# Patient Record
Sex: FEMALE | Race: WHITE | NOT HISPANIC OR LATINO | Employment: FULL TIME | URBAN - METROPOLITAN AREA
[De-identification: names, ages, dates, MRNs, and addresses within clinical notes are randomized per-mention and may not be internally consistent; named-entity substitution may affect disease eponyms.]

---

## 2023-11-01 DIAGNOSIS — F51.04 CHRONIC INSOMNIA: ICD-10-CM

## 2023-11-01 DIAGNOSIS — F41.9 ANXIETY: ICD-10-CM

## 2023-11-01 PROBLEM — E03.9 HYPOTHYROID: Status: ACTIVE | Noted: 2020-08-17

## 2023-11-01 PROBLEM — E66.9 OBESITY: Status: ACTIVE | Noted: 2020-08-17

## 2023-11-01 RX ORDER — ZOLPIDEM TARTRATE 10 MG/1
TABLET ORAL
Qty: 15 TABLET | Refills: 0 | Status: SHIPPED | OUTPATIENT
Start: 2023-11-01 | End: 2023-11-30 | Stop reason: SDUPTHER

## 2023-11-01 RX ORDER — LORAZEPAM 0.5 MG/1
0.5 TABLET ORAL DAILY PRN
COMMUNITY
End: 2023-11-01 | Stop reason: SDUPTHER

## 2023-11-01 RX ORDER — LEVOTHYROXINE SODIUM 50 UG/1
50 TABLET ORAL DAILY
COMMUNITY
End: 2024-02-01

## 2023-11-01 RX ORDER — ZOLPIDEM TARTRATE 10 MG/1
TABLET ORAL
COMMUNITY
End: 2023-11-01 | Stop reason: SDUPTHER

## 2023-11-01 RX ORDER — MONTELUKAST SODIUM 10 MG/1
10 TABLET ORAL DAILY
COMMUNITY
Start: 2023-10-24 | End: 2023-12-20 | Stop reason: SDUPTHER

## 2023-11-01 RX ORDER — LORAZEPAM 0.5 MG/1
0.5 TABLET ORAL DAILY PRN
Qty: 15 TABLET | Refills: 0 | Status: SHIPPED | OUTPATIENT
Start: 2023-11-01 | End: 2023-11-30 | Stop reason: SDUPTHER

## 2023-11-01 RX ORDER — MELOXICAM 15 MG/1
15 TABLET ORAL DAILY
COMMUNITY
End: 2024-02-21 | Stop reason: SDUPTHER

## 2023-11-01 RX ORDER — CITALOPRAM 20 MG/1
40 TABLET, FILM COATED ORAL DAILY
COMMUNITY
End: 2024-02-01

## 2023-11-01 NOTE — TELEPHONE ENCOUNTER
RX Refill  Lorazepam 0.5mg  Zolpidem tartrate 10 mg  [facility]

## 2023-11-02 ENCOUNTER — TELEPHONE (OUTPATIENT)
Dept: PRIMARY CARE | Facility: CLINIC | Age: 63
End: 2023-11-02
Payer: COMMERCIAL

## 2023-11-02 DIAGNOSIS — R73.01 IMPAIRED FASTING GLUCOSE: ICD-10-CM

## 2023-11-02 DIAGNOSIS — Z79.899 DRUG THERAPY CONTINUED: ICD-10-CM

## 2023-11-02 DIAGNOSIS — E66.09 OBESITY DUE TO EXCESS CALORIES WITHOUT SERIOUS COMORBIDITY, UNSPECIFIED CLASSIFICATION: ICD-10-CM

## 2023-11-02 DIAGNOSIS — E03.9 HYPOTHYROIDISM, UNSPECIFIED TYPE: ICD-10-CM

## 2023-11-30 ENCOUNTER — TELEPHONE (OUTPATIENT)
Dept: PRIMARY CARE | Facility: CLINIC | Age: 63
End: 2023-11-30
Payer: COMMERCIAL

## 2023-11-30 DIAGNOSIS — F41.9 ANXIETY: ICD-10-CM

## 2023-11-30 DIAGNOSIS — F51.04 CHRONIC INSOMNIA: ICD-10-CM

## 2023-11-30 RX ORDER — ZOLPIDEM TARTRATE 10 MG/1
TABLET ORAL
Qty: 30 TABLET | Refills: 0 | Status: SHIPPED | OUTPATIENT
Start: 2023-11-30 | End: 2023-12-19 | Stop reason: ALTCHOICE

## 2023-11-30 RX ORDER — LORAZEPAM 0.5 MG/1
0.5 TABLET ORAL DAILY PRN
Qty: 30 TABLET | Refills: 0 | Status: SHIPPED | OUTPATIENT
Start: 2023-11-30 | End: 2024-02-09 | Stop reason: WASHOUT

## 2023-11-30 NOTE — TELEPHONE ENCOUNTER
Rx Refill Request Telephone Encounter    Name:  Cheryl Richter  :  [id number]  Medication Name:  zolpidem 10 mg                                   lorazepam 0.5 mg            Specific Pharmacy location:  [facility]  Date of last appointment:    Date of next appointment:    Best number to reach patient:  [phone]

## 2023-12-07 ENCOUNTER — APPOINTMENT (OUTPATIENT)
Dept: PRIMARY CARE | Facility: CLINIC | Age: 63
End: 2023-12-07
Payer: COMMERCIAL

## 2023-12-08 ENCOUNTER — LAB (OUTPATIENT)
Dept: LAB | Facility: LAB | Age: 63
End: 2023-12-08
Payer: COMMERCIAL

## 2023-12-08 DIAGNOSIS — Z79.899 DRUG THERAPY CONTINUED: ICD-10-CM

## 2023-12-08 DIAGNOSIS — R73.01 IMPAIRED FASTING GLUCOSE: ICD-10-CM

## 2023-12-08 DIAGNOSIS — E66.09 OBESITY DUE TO EXCESS CALORIES WITHOUT SERIOUS COMORBIDITY, UNSPECIFIED CLASSIFICATION: ICD-10-CM

## 2023-12-08 DIAGNOSIS — E03.9 HYPOTHYROIDISM, UNSPECIFIED TYPE: ICD-10-CM

## 2023-12-08 LAB
ALBUMIN SERPL-MCNC: 4.3 G/DL (ref 3.5–5)
ALP BLD-CCNC: 115 U/L (ref 35–125)
ALT SERPL-CCNC: 23 U/L (ref 5–40)
ANION GAP SERPL CALC-SCNC: 11 MMOL/L
AST SERPL-CCNC: 21 U/L (ref 5–40)
BASOPHILS # BLD AUTO: 0.08 X10*3/UL (ref 0–0.1)
BASOPHILS NFR BLD AUTO: 1 %
BILIRUB SERPL-MCNC: 0.3 MG/DL (ref 0.1–1.2)
BUN SERPL-MCNC: 18 MG/DL (ref 8–25)
CALCIUM SERPL-MCNC: 8.9 MG/DL (ref 8.5–10.4)
CHLORIDE SERPL-SCNC: 105 MMOL/L (ref 97–107)
CHOLEST SERPL-MCNC: 183 MG/DL (ref 133–200)
CHOLEST/HDLC SERPL: 5.2 {RATIO}
CO2 SERPL-SCNC: 26 MMOL/L (ref 24–31)
CREAT SERPL-MCNC: 0.8 MG/DL (ref 0.4–1.6)
EOSINOPHIL # BLD AUTO: 0.3 X10*3/UL (ref 0–0.7)
EOSINOPHIL NFR BLD AUTO: 3.6 %
ERYTHROCYTE [DISTWIDTH] IN BLOOD BY AUTOMATED COUNT: 13.2 % (ref 11.5–14.5)
EST. AVERAGE GLUCOSE BLD GHB EST-MCNC: 108 MG/DL
GFR SERPL CREATININE-BSD FRML MDRD: 82 ML/MIN/1.73M*2
GLUCOSE SERPL-MCNC: 82 MG/DL (ref 65–99)
HBA1C MFR BLD: 5.4 %
HCT VFR BLD AUTO: 44.4 % (ref 36–46)
HDLC SERPL-MCNC: 35 MG/DL
HGB BLD-MCNC: 13.9 G/DL (ref 12–16)
IMM GRANULOCYTES # BLD AUTO: 0.05 X10*3/UL (ref 0–0.7)
IMM GRANULOCYTES NFR BLD AUTO: 0.6 % (ref 0–0.9)
LDLC SERPL CALC-MCNC: 85 MG/DL (ref 65–130)
LYMPHOCYTES # BLD AUTO: 1.91 X10*3/UL (ref 1.2–4.8)
LYMPHOCYTES NFR BLD AUTO: 22.7 %
MCH RBC QN AUTO: 28.7 PG (ref 26–34)
MCHC RBC AUTO-ENTMCNC: 31.3 G/DL (ref 32–36)
MCV RBC AUTO: 92 FL (ref 80–100)
MONOCYTES # BLD AUTO: 0.56 X10*3/UL (ref 0.1–1)
MONOCYTES NFR BLD AUTO: 6.7 %
NEUTROPHILS # BLD AUTO: 5.51 X10*3/UL (ref 1.2–7.7)
NEUTROPHILS NFR BLD AUTO: 65.4 %
NRBC BLD-RTO: 0 /100 WBCS (ref 0–0)
PLATELET # BLD AUTO: 305 X10*3/UL (ref 150–450)
POTASSIUM SERPL-SCNC: 4.8 MMOL/L (ref 3.4–5.1)
PROT SERPL-MCNC: 6.7 G/DL (ref 5.9–7.9)
RBC # BLD AUTO: 4.85 X10*6/UL (ref 4–5.2)
SODIUM SERPL-SCNC: 142 MMOL/L (ref 133–145)
TRIGL SERPL-MCNC: 313 MG/DL (ref 40–150)
TSH SERPL DL<=0.05 MIU/L-ACNC: 2.38 MIU/L (ref 0.27–4.2)
WBC # BLD AUTO: 8.4 X10*3/UL (ref 4.4–11.3)

## 2023-12-08 PROCEDURE — 84443 ASSAY THYROID STIM HORMONE: CPT

## 2023-12-08 PROCEDURE — 80061 LIPID PANEL: CPT

## 2023-12-08 PROCEDURE — 36415 COLL VENOUS BLD VENIPUNCTURE: CPT

## 2023-12-08 PROCEDURE — 85025 COMPLETE CBC W/AUTO DIFF WBC: CPT

## 2023-12-08 PROCEDURE — 83036 HEMOGLOBIN GLYCOSYLATED A1C: CPT

## 2023-12-08 PROCEDURE — 80053 COMPREHEN METABOLIC PANEL: CPT

## 2023-12-09 ENCOUNTER — LAB (OUTPATIENT)
Dept: LAB | Facility: LAB | Age: 63
End: 2023-12-09
Payer: COMMERCIAL

## 2023-12-09 LAB
APPEARANCE UR: CLEAR
BILIRUB UR STRIP.AUTO-MCNC: NEGATIVE MG/DL
COLOR UR: NORMAL
GLUCOSE UR STRIP.AUTO-MCNC: NORMAL MG/DL
KETONES UR STRIP.AUTO-MCNC: NEGATIVE MG/DL
LEUKOCYTE ESTERASE UR QL STRIP.AUTO: NEGATIVE
NITRITE UR QL STRIP.AUTO: NEGATIVE
PH UR STRIP.AUTO: 6 [PH]
PROT UR STRIP.AUTO-MCNC: NEGATIVE MG/DL
RBC # UR STRIP.AUTO: NEGATIVE /UL
SP GR UR STRIP.AUTO: 1.01
UROBILINOGEN UR STRIP.AUTO-MCNC: NORMAL MG/DL

## 2023-12-09 PROCEDURE — 81003 URINALYSIS AUTO W/O SCOPE: CPT

## 2023-12-19 ENCOUNTER — TELEPHONE (OUTPATIENT)
Dept: PRIMARY CARE | Facility: CLINIC | Age: 63
End: 2023-12-19

## 2023-12-19 ENCOUNTER — OFFICE VISIT (OUTPATIENT)
Dept: PRIMARY CARE | Facility: CLINIC | Age: 63
End: 2023-12-19
Payer: COMMERCIAL

## 2023-12-19 VITALS
WEIGHT: 180 LBS | SYSTOLIC BLOOD PRESSURE: 130 MMHG | OXYGEN SATURATION: 98 % | DIASTOLIC BLOOD PRESSURE: 78 MMHG | HEART RATE: 78 BPM | HEIGHT: 60 IN | BODY MASS INDEX: 35.34 KG/M2

## 2023-12-19 DIAGNOSIS — F41.9 ANXIETY: ICD-10-CM

## 2023-12-19 DIAGNOSIS — E66.09 CLASS 2 OBESITY DUE TO EXCESS CALORIES WITHOUT SERIOUS COMORBIDITY WITH BODY MASS INDEX (BMI) OF 35.0 TO 35.9 IN ADULT: ICD-10-CM

## 2023-12-19 DIAGNOSIS — Z12.31 ENCOUNTER FOR SCREENING MAMMOGRAM FOR BREAST CANCER: ICD-10-CM

## 2023-12-19 DIAGNOSIS — F51.04 CHRONIC INSOMNIA: ICD-10-CM

## 2023-12-19 DIAGNOSIS — E03.9 ACQUIRED HYPOTHYROIDISM: ICD-10-CM

## 2023-12-19 DIAGNOSIS — L98.8 WRINKLES: Primary | ICD-10-CM

## 2023-12-19 DIAGNOSIS — J30.2 SEASONAL ALLERGIES: ICD-10-CM

## 2023-12-19 DIAGNOSIS — Z00.00 WELL ADULT EXAM: Primary | ICD-10-CM

## 2023-12-19 PROCEDURE — 1036F TOBACCO NON-USER: CPT | Performed by: FAMILY MEDICINE

## 2023-12-19 PROCEDURE — 1170F FXNL STATUS ASSESSED: CPT | Performed by: FAMILY MEDICINE

## 2023-12-19 PROCEDURE — 99397 PER PM REEVAL EST PAT 65+ YR: CPT | Performed by: FAMILY MEDICINE

## 2023-12-19 PROCEDURE — 3008F BODY MASS INDEX DOCD: CPT | Performed by: FAMILY MEDICINE

## 2023-12-19 PROCEDURE — 1159F MED LIST DOCD IN RCRD: CPT | Performed by: FAMILY MEDICINE

## 2023-12-19 PROCEDURE — 1126F AMNT PAIN NOTED NONE PRSNT: CPT | Performed by: FAMILY MEDICINE

## 2023-12-19 RX ORDER — TRAZODONE HYDROCHLORIDE 100 MG/1
100 TABLET ORAL NIGHTLY PRN
Qty: 90 TABLET | Refills: 1 | Status: SHIPPED | OUTPATIENT
Start: 2023-12-19 | End: 2023-12-28 | Stop reason: SINTOL

## 2023-12-19 ASSESSMENT — PATIENT HEALTH QUESTIONNAIRE - PHQ9
SUM OF ALL RESPONSES TO PHQ9 QUESTIONS 1 AND 2: 0
2. FEELING DOWN, DEPRESSED OR HOPELESS: NOT AT ALL
1. LITTLE INTEREST OR PLEASURE IN DOING THINGS: NOT AT ALL

## 2023-12-19 ASSESSMENT — ACTIVITIES OF DAILY LIVING (ADL)
BATHING: INDEPENDENT
DOING_HOUSEWORK: INDEPENDENT
MANAGING_FINANCES: INDEPENDENT
DRESSING: INDEPENDENT
GROCERY_SHOPPING: INDEPENDENT
TAKING_MEDICATION: INDEPENDENT

## 2023-12-19 ASSESSMENT — PAIN SCALES - GENERAL: PAINLEVEL: 0-NO PAIN

## 2023-12-19 NOTE — PROGRESS NOTES
"Subjective   Patient ID: Cheryl Richter is a 63 y.o. female who presents for Annual Exam (Refuses colonoscopy & EKG).    HPI   Cheryl is seen for comprehensive physical exam.  PMH, PSH, family history and social history were reviewed and updated.  GYN - sees specialist  SHONDA/Depression - controlled on citalopram 40mg daily and Ativan up to once a day as needed, OARRS reviewed  Chronic Insomnia - has used ambien 5mg as needed, has not been working so was taking it with the ativan,   Hypothyroidism - taking on empty stomach, No unexplained weight changes, fatigue, or excessive hair loss.    Review of Systems   Constitutional:  Negative for activity change, fatigue, fever and unexpected weight change.   HENT:  Negative for congestion, ear pain, hearing loss, rhinorrhea, sore throat and trouble swallowing.    Eyes:  Negative for visual disturbance.   Respiratory:  Negative for cough and shortness of breath.    Cardiovascular:  Negative for chest pain and palpitations.   Gastrointestinal:  Negative for abdominal pain and blood in stool.   Genitourinary:  Negative for difficulty urinating and hematuria.   Musculoskeletal:  Negative for arthralgias and myalgias.   Skin:  Negative for rash.   Neurological:  Negative for dizziness and numbness.   Psychiatric/Behavioral:  Negative for dysphoric mood. The patient is not nervous/anxious.        Objective   /78   Pulse 78   Ht 1.511 m (4' 11.5\")   Wt 81.6 kg (180 lb)   LMP  (LMP Unknown)   SpO2 98%   BMI 35.75 kg/m²     Physical Exam  Vitals and nursing note reviewed.   Constitutional:       General: She is not in acute distress.  HENT:      Right Ear: Tympanic membrane and ear canal normal.      Left Ear: Tympanic membrane and ear canal normal.      Nose: Nose normal.      Mouth/Throat:      Mouth: Mucous membranes are moist.      Pharynx: Oropharynx is clear.   Eyes:      Extraocular Movements: Extraocular movements intact.      Pupils: Pupils are equal, round, and " reactive to light.   Neck:      Vascular: No carotid bruit.   Cardiovascular:      Rate and Rhythm: Normal rate and regular rhythm.      Pulses: Normal pulses.      Heart sounds: Normal heart sounds.   Pulmonary:      Breath sounds: Normal breath sounds.   Abdominal:      General: Bowel sounds are normal.      Palpations: Abdomen is soft.      Tenderness: There is no abdominal tenderness.   Musculoskeletal:         General: Normal range of motion.   Skin:     General: Skin is warm.      Findings: No rash.   Neurological:      General: No focal deficit present.      Mental Status: She is alert.   Psychiatric:         Mood and Affect: Mood normal.         Assessment/Plan   Problem List Items Addressed This Visit             ICD-10-CM    Anxiety F41.9     Self relaxation discussed.  Continue with Citalopram daily.  Discussed long term issues related to use of Ativan including addiction and/or overdose.  No red flags. OARRS reviewed.          Chronic insomnia F51.04     Good sleep hygiene discussed.  Other medication options discussed in detail given that patient should not be on Ambien long-term.  Discussed changing to trazodone nightly as she has not tried this medication.  Patient agrees with medication change.         Relevant Medications    traZODone (Desyrel) 100 mg tablet    Hypothyroid E03.9     Euthyroid.  Continue to take levothyroxine on empty stomach         Obesity E66.9    Relevant Medications    semaglutide 0.25 mg or 0.5 mg (2 mg/3 mL) pen injector     Other Visit Diagnoses         Codes    Well adult exam    -  Primary  Preventative measures discussed.   Z00.00    Encounter for screening mammogram for breast cancer     Z12.31    Relevant Orders    BI mammo bilateral screening tomosynthesis

## 2023-12-19 NOTE — TELEPHONE ENCOUNTER
PT WAS SEEN TODAY FOR CPE WITH [facility] AND FORGOT TO ASK IF [facility] CAN GIVE HER RX FOR  MONTELUKAST 10 MG 1 X DAILY,  FOR SINUS DRAINAGE . THE DR WHO  WAS DOING THIS IS NO LONGER IN PRACTICE. CHARLEE DAVIES .

## 2023-12-20 RX ORDER — TRETINOIN 0.25 MG/G
CREAM TOPICAL NIGHTLY
COMMUNITY
End: 2023-12-20 | Stop reason: SDUPTHER

## 2023-12-20 RX ORDER — MONTELUKAST SODIUM 10 MG/1
10 TABLET ORAL DAILY
Qty: 90 TABLET | Refills: 4 | Status: SHIPPED | OUTPATIENT
Start: 2023-12-20

## 2023-12-20 RX ORDER — TRETINOIN 0.25 MG/G
CREAM TOPICAL NIGHTLY
Qty: 20 G | Refills: 3 | Status: SHIPPED | OUTPATIENT
Start: 2023-12-20

## 2023-12-21 ENCOUNTER — TELEPHONE (OUTPATIENT)
Dept: PRIMARY CARE | Facility: CLINIC | Age: 63
End: 2023-12-21
Payer: COMMERCIAL

## 2023-12-21 NOTE — TELEPHONE ENCOUNTER
"Pt informed & educated stated \"that is age discrimination\" and \"I have taken them both for years\" stated \"ok thank you good bye\" and hung up than called back and asked if she stops taking the ativan can she have the Ambien back?  "

## 2023-12-25 ASSESSMENT — ENCOUNTER SYMPTOMS
ARTHRALGIAS: 0
PALPITATIONS: 0
DYSPHORIC MOOD: 0
FATIGUE: 0
DIFFICULTY URINATING: 0
MYALGIAS: 0
UNEXPECTED WEIGHT CHANGE: 0
ACTIVITY CHANGE: 0
NERVOUS/ANXIOUS: 0
HEMATURIA: 0
SORE THROAT: 0
DIZZINESS: 0
RHINORRHEA: 0
COUGH: 0
ABDOMINAL PAIN: 0
FEVER: 0
SHORTNESS OF BREATH: 0
NUMBNESS: 0
BLOOD IN STOOL: 0
TROUBLE SWALLOWING: 0

## 2023-12-26 NOTE — ASSESSMENT & PLAN NOTE
Good sleep hygiene discussed.  Other medication options discussed in detail given that patient should not be on Ambien long-term.  Discussed changing to trazodone nightly as she has not tried this medication.  Patient agrees with medication change.

## 2023-12-26 NOTE — ASSESSMENT & PLAN NOTE
Self relaxation discussed.  Continue with Citalopram daily.  Discussed long term issues related to use of Ativan including addiction and/or overdose.  No red flags. OARRS reviewed.

## 2023-12-28 ENCOUNTER — TELEPHONE (OUTPATIENT)
Dept: PRIMARY CARE | Facility: CLINIC | Age: 63
End: 2023-12-28
Payer: COMMERCIAL

## 2023-12-28 DIAGNOSIS — F51.04 CHRONIC INSOMNIA: ICD-10-CM

## 2023-12-28 RX ORDER — ZOLPIDEM TARTRATE 5 MG/1
5 TABLET ORAL NIGHTLY PRN
Qty: 30 TABLET | Refills: 0 | Status: SHIPPED | OUTPATIENT
Start: 2023-12-28 | End: 2024-01-25 | Stop reason: SDUPTHER

## 2023-12-28 NOTE — TELEPHONE ENCOUNTER
RX Line:  Tretinoin cream, [facility] stating [facility] need more information to fill prescription. Also mentioned she was told Ambien would be getting sent in for her on the 29th. Hasn't received it.

## 2024-01-10 ENCOUNTER — TELEPHONE (OUTPATIENT)
Dept: PRIMARY CARE | Facility: CLINIC | Age: 64
End: 2024-01-10
Payer: COMMERCIAL

## 2024-01-10 DIAGNOSIS — E66.09 CLASS 2 OBESITY DUE TO EXCESS CALORIES WITHOUT SERIOUS COMORBIDITY WITH BODY MASS INDEX (BMI) OF 35.0 TO 35.9 IN ADULT: ICD-10-CM

## 2024-01-17 ENCOUNTER — TELEPHONE (OUTPATIENT)
Dept: PRIMARY CARE | Facility: CLINIC | Age: 64
End: 2024-01-17
Payer: COMMERCIAL

## 2024-01-17 NOTE — TELEPHONE ENCOUNTER
PT CALLED BACK TO MAKE THE WEIGHT CHECK APPT, ASKED IF SHE HAS TO PAY HER COPAY EVERYTIME, I SAID YES, SHE THEN ASKED HOW OFTEN SHE WILL NEED TO COME IN, I TOLD HER I DID  NOT KNOW . SHE SAID SHE WOULD HAVE TO THINK ABOUT THIS A LITTLE MORE, CAN NOT AFFORD TO PAY THE COPAYS.

## 2024-01-25 ENCOUNTER — TELEPHONE (OUTPATIENT)
Dept: PRIMARY CARE | Facility: CLINIC | Age: 64
End: 2024-01-25
Payer: COMMERCIAL

## 2024-01-25 DIAGNOSIS — F51.04 CHRONIC INSOMNIA: ICD-10-CM

## 2024-01-25 RX ORDER — ZOLPIDEM TARTRATE 5 MG/1
5 TABLET ORAL NIGHTLY PRN
Qty: 30 TABLET | Refills: 0 | Status: SHIPPED | OUTPATIENT
Start: 2024-01-25 | End: 2024-02-23 | Stop reason: SDUPTHER

## 2024-01-30 ENCOUNTER — PATIENT MESSAGE (OUTPATIENT)
Dept: PRIMARY CARE | Facility: CLINIC | Age: 64
End: 2024-01-30
Payer: COMMERCIAL

## 2024-01-30 DIAGNOSIS — L98.8 WRINKLES: ICD-10-CM

## 2024-01-30 RX ORDER — TRETINOIN 0.25 MG/G
CREAM TOPICAL NIGHTLY
Qty: 45 G | Refills: 3 | OUTPATIENT
Start: 2024-01-30

## 2024-02-01 DIAGNOSIS — E03.9 ACQUIRED HYPOTHYROIDISM: ICD-10-CM

## 2024-02-01 DIAGNOSIS — E66.09 CLASS 2 OBESITY DUE TO EXCESS CALORIES WITHOUT SERIOUS COMORBIDITY WITH BODY MASS INDEX (BMI) OF 35.0 TO 35.9 IN ADULT: ICD-10-CM

## 2024-02-01 DIAGNOSIS — F43.21 ADJUSTMENT DISORDER WITH DEPRESSED MOOD: Primary | ICD-10-CM

## 2024-02-01 RX ORDER — CITALOPRAM 20 MG/1
40 TABLET, FILM COATED ORAL DAILY
Qty: 180 TABLET | Refills: 4 | Status: SHIPPED | OUTPATIENT
Start: 2024-02-01

## 2024-02-01 RX ORDER — SEMAGLUTIDE 0.68 MG/ML
0.25 INJECTION, SOLUTION SUBCUTANEOUS
Qty: 3 ML | Refills: 0 | Status: SHIPPED | OUTPATIENT
Start: 2024-02-01 | End: 2024-02-02

## 2024-02-01 RX ORDER — LEVOTHYROXINE SODIUM 50 UG/1
50 TABLET ORAL DAILY
Qty: 90 TABLET | Refills: 3 | Status: SHIPPED | OUTPATIENT
Start: 2024-02-01

## 2024-02-02 ENCOUNTER — APPOINTMENT (OUTPATIENT)
Dept: PRIMARY CARE | Facility: CLINIC | Age: 64
End: 2024-02-02
Payer: COMMERCIAL

## 2024-02-02 DIAGNOSIS — E66.09 CLASS 2 OBESITY DUE TO EXCESS CALORIES WITHOUT SERIOUS COMORBIDITY WITH BODY MASS INDEX (BMI) OF 35.0 TO 35.9 IN ADULT: ICD-10-CM

## 2024-02-03 RX ORDER — SEMAGLUTIDE 0.68 MG/ML
0.25 INJECTION, SOLUTION SUBCUTANEOUS
OUTPATIENT
Start: 2024-02-03

## 2024-02-05 ENCOUNTER — TELEPHONE (OUTPATIENT)
Dept: PRIMARY CARE | Facility: CLINIC | Age: 64
End: 2024-02-05
Payer: COMMERCIAL

## 2024-02-09 ENCOUNTER — DOCUMENTATION (OUTPATIENT)
Dept: PRIMARY CARE | Facility: CLINIC | Age: 64
End: 2024-02-09

## 2024-02-09 ENCOUNTER — PATIENT MESSAGE (OUTPATIENT)
Dept: PRIMARY CARE | Facility: CLINIC | Age: 64
End: 2024-02-09

## 2024-02-09 ENCOUNTER — OFFICE VISIT (OUTPATIENT)
Dept: PRIMARY CARE | Facility: CLINIC | Age: 64
End: 2024-02-09
Payer: COMMERCIAL

## 2024-02-09 VITALS
TEMPERATURE: 98 F | WEIGHT: 174 LBS | OXYGEN SATURATION: 97 % | HEART RATE: 100 BPM | HEIGHT: 59 IN | DIASTOLIC BLOOD PRESSURE: 86 MMHG | SYSTOLIC BLOOD PRESSURE: 128 MMHG | BODY MASS INDEX: 35.08 KG/M2

## 2024-02-09 DIAGNOSIS — F43.21 ADJUSTMENT DISORDER WITH DEPRESSED MOOD: ICD-10-CM

## 2024-02-09 DIAGNOSIS — R73.01 IMPAIRED FASTING GLUCOSE: Primary | ICD-10-CM

## 2024-02-09 DIAGNOSIS — E66.09 OBESITY DUE TO EXCESS CALORIES WITHOUT SERIOUS COMORBIDITY, UNSPECIFIED CLASSIFICATION: Primary | ICD-10-CM

## 2024-02-09 DIAGNOSIS — R73.01 IMPAIRED FASTING GLUCOSE: ICD-10-CM

## 2024-02-09 PROCEDURE — 1126F AMNT PAIN NOTED NONE PRSNT: CPT | Performed by: FAMILY MEDICINE

## 2024-02-09 PROCEDURE — 3008F BODY MASS INDEX DOCD: CPT | Performed by: FAMILY MEDICINE

## 2024-02-09 PROCEDURE — 1159F MED LIST DOCD IN RCRD: CPT | Performed by: FAMILY MEDICINE

## 2024-02-09 PROCEDURE — 99214 OFFICE O/P EST MOD 30 MIN: CPT | Performed by: FAMILY MEDICINE

## 2024-02-09 PROCEDURE — 1036F TOBACCO NON-USER: CPT | Performed by: FAMILY MEDICINE

## 2024-02-09 ASSESSMENT — PAIN SCALES - GENERAL: PAINLEVEL: 0-NO PAIN

## 2024-02-09 NOTE — PROGRESS NOTES
"Subjective   Patient ID: Cheryl Richter is a 63 y.o. female who presents for Weight Check.    HPI   Cheryl presents for follow up on her ozempic. Initially wasn't giving herself injection correctly. Lost son unexpectedly after [place] from alcohol. January started taking the ozempic regularly and doing well. No constipation. Improving dietary choices. Walking at work but nothing extra.     Review of Systems  All other systems have been reviewed and are negative except as noted in the HPI.       Objective   /86 (BP Location: Left arm)   Pulse 100   Temp 36.7 °C (98 °F) (Temporal)   Ht 1.499 m (4' 11\")   Wt 78.9 kg (174 lb)   LMP  (LMP Unknown)   SpO2 97%   BMI 35.14 kg/m²     Physical Exam  Alert.  No acute distress.  Judgment and insight intact.    Assessment/Plan   Diagnoses and all orders for this visit:  Impaired fasting glucose  -     semaglutide 0.25 mg or 0.5 mg (2 mg/3 mL) pen injector; Inject 0.5 mg under the skin 1 (one) time per week.  - Continue with semaglutide.  Adjustment disorder with depressed mood   Continue with citalopram 40 mg daily.  Zolpidem at bedtime.  Indications, benefits and potential risks of long-term use discussed in detail with patient including the possibility of addiction and overdose.       "

## 2024-02-11 RX ORDER — ORLISTAT 120 MG/1
120 CAPSULE ORAL
Qty: 90 CAPSULE | Refills: 11 | Status: SHIPPED | OUTPATIENT
Start: 2024-02-11 | End: 2024-02-14 | Stop reason: SDUPTHER

## 2024-02-12 ENCOUNTER — PATIENT MESSAGE (OUTPATIENT)
Dept: PRIMARY CARE | Facility: CLINIC | Age: 64
End: 2024-02-12
Payer: COMMERCIAL

## 2024-02-12 DIAGNOSIS — R73.01 IMPAIRED FASTING GLUCOSE: ICD-10-CM

## 2024-02-12 DIAGNOSIS — E66.09 OBESITY DUE TO EXCESS CALORIES WITHOUT SERIOUS COMORBIDITY, UNSPECIFIED CLASSIFICATION: ICD-10-CM

## 2024-02-12 RX ORDER — ORLISTAT 120 MG/1
120 CAPSULE ORAL
Qty: 270 CAPSULE | Refills: 3 | OUTPATIENT
Start: 2024-02-12 | End: 2025-02-11

## 2024-02-14 ENCOUNTER — PATIENT MESSAGE (OUTPATIENT)
Dept: PRIMARY CARE | Facility: CLINIC | Age: 64
End: 2024-02-14
Payer: COMMERCIAL

## 2024-02-14 DIAGNOSIS — E66.09 OBESITY DUE TO EXCESS CALORIES WITHOUT SERIOUS COMORBIDITY, UNSPECIFIED CLASSIFICATION: ICD-10-CM

## 2024-02-14 DIAGNOSIS — R73.01 IMPAIRED FASTING GLUCOSE: ICD-10-CM

## 2024-02-14 RX ORDER — ORLISTAT 120 MG/1
120 CAPSULE ORAL
Qty: 270 CAPSULE | Refills: 1 | Status: SHIPPED | OUTPATIENT
Start: 2024-02-14 | End: 2024-02-21 | Stop reason: SDUPTHER

## 2024-02-21 ENCOUNTER — PATIENT MESSAGE (OUTPATIENT)
Dept: PRIMARY CARE | Facility: CLINIC | Age: 64
End: 2024-02-21
Payer: COMMERCIAL

## 2024-02-21 DIAGNOSIS — R52 PAIN: ICD-10-CM

## 2024-02-21 DIAGNOSIS — E66.09 OBESITY DUE TO EXCESS CALORIES WITHOUT SERIOUS COMORBIDITY, UNSPECIFIED CLASSIFICATION: ICD-10-CM

## 2024-02-21 DIAGNOSIS — R73.01 IMPAIRED FASTING GLUCOSE: ICD-10-CM

## 2024-02-21 RX ORDER — MELOXICAM 15 MG/1
15 TABLET ORAL DAILY
Qty: 90 TABLET | Refills: 1 | Status: SHIPPED | OUTPATIENT
Start: 2024-02-21

## 2024-02-21 RX ORDER — ORLISTAT 120 MG/1
120 CAPSULE ORAL
Qty: 270 CAPSULE | Refills: 1 | Status: SHIPPED | OUTPATIENT
Start: 2024-02-21 | End: 2024-03-14

## 2024-02-23 DIAGNOSIS — F51.04 CHRONIC INSOMNIA: ICD-10-CM

## 2024-02-23 RX ORDER — ZOLPIDEM TARTRATE 5 MG/1
5 TABLET ORAL NIGHTLY PRN
Qty: 30 TABLET | Refills: 0 | Status: SHIPPED | OUTPATIENT
Start: 2024-02-23 | End: 2024-03-14 | Stop reason: SDUPTHER

## 2024-02-23 NOTE — TELEPHONE ENCOUNTER
I have personally reviewed the OARRS report.  I have considered the risks of abuse, dependence, addiction and diversion.  I believe that it is clinically appropriate for patient to be prescribed this medication.

## 2024-03-11 ENCOUNTER — TELEPHONE (OUTPATIENT)
Dept: PRIMARY CARE | Facility: CLINIC | Age: 64
End: 2024-03-11
Payer: COMMERCIAL

## 2024-03-11 NOTE — TELEPHONE ENCOUNTER
[facility] CALLING  ,  RX FOR ORLISTAT  120 MG  CAPS ARE ON BACK ORDER CAN SOMETHING ELSE BE SENT IN?  [phone] REF #[id number]

## 2024-03-13 ENCOUNTER — PATIENT MESSAGE (OUTPATIENT)
Dept: PRIMARY CARE | Facility: CLINIC | Age: 64
End: 2024-03-13
Payer: COMMERCIAL

## 2024-03-14 ENCOUNTER — PATIENT MESSAGE (OUTPATIENT)
Dept: PRIMARY CARE | Facility: CLINIC | Age: 64
End: 2024-03-14
Payer: COMMERCIAL

## 2024-03-14 ENCOUNTER — TELEPHONE (OUTPATIENT)
Dept: PRIMARY CARE | Facility: CLINIC | Age: 64
End: 2024-03-14
Payer: COMMERCIAL

## 2024-03-14 DIAGNOSIS — E66.09 OBESITY DUE TO EXCESS CALORIES WITHOUT SERIOUS COMORBIDITY, UNSPECIFIED CLASSIFICATION: Primary | ICD-10-CM

## 2024-03-14 DIAGNOSIS — F51.04 CHRONIC INSOMNIA: ICD-10-CM

## 2024-03-14 NOTE — TELEPHONE ENCOUNTER
Patient called RX line to get a refill on zolpidem 5  mg.  [facility] [address]  Contact: Mobile

## 2024-03-20 RX ORDER — ZOLPIDEM TARTRATE 5 MG/1
5 TABLET ORAL NIGHTLY PRN
Qty: 30 TABLET | Refills: 0 | Status: SHIPPED | OUTPATIENT
Start: 2024-03-20 | End: 2024-04-23 | Stop reason: SDUPTHER

## 2024-04-23 ENCOUNTER — TELEPHONE (OUTPATIENT)
Dept: PRIMARY CARE | Facility: CLINIC | Age: 64
End: 2024-04-23

## 2024-04-23 DIAGNOSIS — F51.04 CHRONIC INSOMNIA: ICD-10-CM

## 2024-04-23 RX ORDER — ZOLPIDEM TARTRATE 5 MG/1
5 TABLET ORAL NIGHTLY PRN
Qty: 30 TABLET | Refills: 0 | Status: SHIPPED | OUTPATIENT
Start: 2024-04-23 | End: 2024-05-24 | Stop reason: SDUPTHER

## 2024-04-23 NOTE — TELEPHONE ENCOUNTER
Patient called rx line:    zolpidem (Ambien) 5mg tablet    [facility] [address] in [place]    Patient Number: [phone]

## 2024-05-24 ENCOUNTER — TELEPHONE (OUTPATIENT)
Dept: PRIMARY CARE | Facility: CLINIC | Age: 64
End: 2024-05-24
Payer: COMMERCIAL

## 2024-05-24 DIAGNOSIS — F51.04 CHRONIC INSOMNIA: ICD-10-CM

## 2024-05-24 RX ORDER — ZOLPIDEM TARTRATE 5 MG/1
5 TABLET ORAL NIGHTLY PRN
Qty: 30 TABLET | Refills: 0 | Status: SHIPPED | OUTPATIENT
Start: 2024-05-24 | End: 2024-06-23

## 2024-05-24 NOTE — TELEPHONE ENCOUNTER
Patient needs refill of Zolpidem 5 mg sent to [facility] on [address] in [place].    Contact:  [phone]

## 2024-06-20 ENCOUNTER — TELEPHONE (OUTPATIENT)
Dept: PRIMARY CARE | Facility: CLINIC | Age: 64
End: 2024-06-20
Payer: COMMERCIAL

## 2024-06-20 DIAGNOSIS — F51.04 CHRONIC INSOMNIA: ICD-10-CM

## 2024-06-20 RX ORDER — ZOLPIDEM TARTRATE 5 MG/1
5 TABLET ORAL NIGHTLY PRN
Qty: 30 TABLET | Refills: 0 | Status: SHIPPED | OUTPATIENT
Start: 2024-06-20 | End: 2024-07-20

## 2024-06-20 NOTE — TELEPHONE ENCOUNTER
Patient called Rx line and requested a refill for Zolpidem 5 mg.   Pharmacy: [facility]  Patient phone #: [phone]

## 2024-07-22 ENCOUNTER — TELEPHONE (OUTPATIENT)
Dept: PRIMARY CARE | Facility: CLINIC | Age: 64
End: 2024-07-22
Payer: COMMERCIAL

## 2024-07-22 DIAGNOSIS — J30.2 SEASONAL ALLERGIES: ICD-10-CM

## 2024-07-22 RX ORDER — MONTELUKAST SODIUM 10 MG/1
10 TABLET ORAL DAILY
Qty: 90 TABLET | Refills: 1 | Status: SHIPPED | OUTPATIENT
Start: 2024-07-22 | End: 2025-01-18

## 2024-07-22 NOTE — TELEPHONE ENCOUNTER
Patient called Rx line and requested a refill for Montelukast 10 mg.   Pharmacy: [facility]  Patient phone #: [phone]

## 2024-07-29 DIAGNOSIS — F51.04 CHRONIC INSOMNIA: ICD-10-CM

## 2024-07-29 RX ORDER — ZOLPIDEM TARTRATE 5 MG/1
5 TABLET ORAL NIGHTLY PRN
Qty: 30 TABLET | Refills: 0 | Status: SHIPPED | OUTPATIENT
Start: 2024-07-29 | End: 2024-08-28

## 2024-07-29 NOTE — TELEPHONE ENCOUNTER
Patient wants to switch from [facility] to [facility]. She needs an appt within 30 days for refills and her CPE needs scheduled for December. I told her once we find out if the PCP switch is approved or not, we will call her to schedule this. Her number is [phone]

## 2024-07-29 NOTE — TELEPHONE ENCOUNTER
Patient wants a refill on Zolpidem 5 mg.  Pharmacy is [facility] on [address] [facility] [address] [address] in [place].    Patient's number:  [phone]

## 2024-07-30 ENCOUNTER — TELEPHONE (OUTPATIENT)
Dept: PRIMARY CARE | Facility: CLINIC | Age: 64
End: 2024-07-30
Payer: COMMERCIAL

## 2024-07-30 NOTE — TELEPHONE ENCOUNTER
Pt stated that she requested to change her pcp, I did not see that. She wishes to cancel that request

## 2024-08-02 DIAGNOSIS — R52 PAIN: ICD-10-CM

## 2024-08-02 RX ORDER — MELOXICAM 15 MG/1
15 TABLET ORAL DAILY
Qty: 90 TABLET | Refills: 1 | Status: SHIPPED | OUTPATIENT
Start: 2024-08-02

## 2024-08-08 ENCOUNTER — APPOINTMENT (OUTPATIENT)
Dept: PRIMARY CARE | Facility: CLINIC | Age: 64
End: 2024-08-08
Payer: COMMERCIAL

## 2024-08-08 VITALS
SYSTOLIC BLOOD PRESSURE: 124 MMHG | WEIGHT: 179 LBS | BODY MASS INDEX: 36.15 KG/M2 | OXYGEN SATURATION: 99 % | DIASTOLIC BLOOD PRESSURE: 72 MMHG | HEART RATE: 86 BPM

## 2024-08-08 DIAGNOSIS — R73.01 IMPAIRED FASTING GLUCOSE: ICD-10-CM

## 2024-08-08 DIAGNOSIS — E66.09 OBESITY DUE TO EXCESS CALORIES WITHOUT SERIOUS COMORBIDITY, UNSPECIFIED CLASSIFICATION: ICD-10-CM

## 2024-08-08 DIAGNOSIS — E66.09 OBESITY DUE TO EXCESS CALORIES WITHOUT SERIOUS COMORBIDITY, UNSPECIFIED CLASSIFICATION: Primary | ICD-10-CM

## 2024-08-08 DIAGNOSIS — M67.40 GANGLION CYST: ICD-10-CM

## 2024-08-08 PROCEDURE — 1159F MED LIST DOCD IN RCRD: CPT | Performed by: FAMILY MEDICINE

## 2024-08-08 PROCEDURE — 1126F AMNT PAIN NOTED NONE PRSNT: CPT | Performed by: FAMILY MEDICINE

## 2024-08-08 PROCEDURE — 1123F ACP DISCUSS/DSCN MKR DOCD: CPT | Performed by: FAMILY MEDICINE

## 2024-08-08 PROCEDURE — 1160F RVW MEDS BY RX/DR IN RCRD: CPT | Performed by: FAMILY MEDICINE

## 2024-08-08 PROCEDURE — 99213 OFFICE O/P EST LOW 20 MIN: CPT | Performed by: FAMILY MEDICINE

## 2024-08-08 RX ORDER — SEMAGLUTIDE 0.5 MG/.5ML
0.5 INJECTION, SOLUTION SUBCUTANEOUS
Qty: 6 ML | Refills: 0 | Status: SHIPPED | OUTPATIENT
Start: 2024-08-11 | End: 2024-08-13 | Stop reason: SDUPTHER

## 2024-08-08 ASSESSMENT — PAIN SCALES - GENERAL: PAINLEVEL: 0-NO PAIN

## 2024-08-08 NOTE — PROGRESS NOTES
Subjective   Patient ID: Cheryl Richter is a 64 y.o. female who presents for Follow-up (Semaglutide.).    HPI   Right middle finger cyst    Review of Systems    Objective   /72   Pulse 86   Wt 81.2 kg (179 lb)   LMP  (LMP Unknown)   SpO2 99%   BMI 36.15 kg/m²     Physical Exam    Assessment/Plan

## 2024-08-08 NOTE — ASSESSMENT & PLAN NOTE
Continue with Wegovy.  Will increase to half milligram nightly for the next 12 weeks.  Healthy diet and exercise discussed.  Follow-up in 3 months  Orders:    semaglutide, weight loss, (Wegovy) 0.5 mg/0.5 mL pen injector; Inject 0.5 mg under the skin 1 (one) time per week for 12 doses.

## 2024-08-08 NOTE — ASSESSMENT & PLAN NOTE
Orders:    semaglutide, weight loss, (Wegovy) 0.5 mg/0.5 mL pen injector; Inject 0.5 mg under the skin 1 (one) time per week for 12 doses.

## 2024-08-08 NOTE — PROGRESS NOTES
Subjective   Patient ID: Cheryl Richter is a 64 y.o. female who presents for Follow-up (Semaglutide.).    Cheryl Richter is seen for her chronic issues.  She was started on semaglutide in the past secondary to her anxiety.  Feels that is helping immensely.  Curbs her appetite.      Review of Systems  All other systems have been reviewed and are negative except as noted in the HPI.       Objective  Vitals:  /72   Pulse 86   Wt 81.2 kg (179 lb)   LMP  (LMP Unknown)   SpO2 99%   BMI 36.15 kg/m²     Physical Exam  Alert.  No acute distress.  Judgment and insight intact.    Assessment/Plan   Assessment & Plan  Obesity due to excess calories without serious comorbidity, unspecified classification  Continue with Wegovy.  Will increase to half milligram nightly for the next 12 weeks.  Healthy diet and exercise discussed.  Follow-up in 3 months  Orders:    semaglutide, weight loss, (Wegovy) 0.5 mg/0.5 mL pen injector; Inject 0.5 mg under the skin 1 (one) time per week for 12 doses.    Impaired fasting glucose    Orders:    semaglutide, weight loss, (Wegovy) 0.5 mg/0.5 mL pen injector; Inject 0.5 mg under the skin 1 (one) time per week for 12 doses.    Ganglion cyst    Orders:    Referral to Orthopaedic Surgery; Future

## 2024-08-13 ENCOUNTER — TELEPHONE (OUTPATIENT)
Dept: PRIMARY CARE | Facility: CLINIC | Age: 64
End: 2024-08-13
Payer: COMMERCIAL

## 2024-08-13 RX ORDER — SEMAGLUTIDE 0.5 MG/.5ML
0.5 INJECTION, SOLUTION SUBCUTANEOUS
Qty: 6 ML | Refills: 0 | Status: SHIPPED | OUTPATIENT
Start: 2024-08-18 | End: 2024-11-04

## 2024-08-13 NOTE — TELEPHONE ENCOUNTER
Patient requesting a new RX for Wegovy to be sent to the [facility] on [address] [address]. The Mail order pharmacy called her and said they no longer mail Wegovy    Contact: [phone]

## 2024-08-27 ENCOUNTER — TELEPHONE (OUTPATIENT)
Dept: PRIMARY CARE | Facility: CLINIC | Age: 64
End: 2024-08-27
Payer: COMMERCIAL

## 2024-08-27 DIAGNOSIS — F51.04 CHRONIC INSOMNIA: ICD-10-CM

## 2024-08-27 RX ORDER — ZOLPIDEM TARTRATE 5 MG/1
5 TABLET ORAL NIGHTLY PRN
Qty: 30 TABLET | Refills: 0 | Status: SHIPPED | OUTPATIENT
Start: 2024-08-27 | End: 2024-09-26

## 2024-08-27 NOTE — TELEPHONE ENCOUNTER
Patient called on RX line to request a refill of Zolpidem 5 mg be forwarded to [facility] in [place].  Please advise.

## 2024-09-08 ENCOUNTER — PATIENT MESSAGE (OUTPATIENT)
Dept: PRIMARY CARE | Facility: CLINIC | Age: 64
End: 2024-09-08
Payer: COMMERCIAL

## 2024-09-08 DIAGNOSIS — R73.01 IMPAIRED FASTING GLUCOSE: ICD-10-CM

## 2024-09-08 DIAGNOSIS — E66.09 OBESITY DUE TO EXCESS CALORIES WITHOUT SERIOUS COMORBIDITY, UNSPECIFIED CLASSIFICATION: ICD-10-CM

## 2024-09-09 RX ORDER — SEMAGLUTIDE 0.5 MG/.5ML
1 INJECTION, SOLUTION SUBCUTANEOUS
Qty: 12 ML | Refills: 0 | Status: SHIPPED | OUTPATIENT
Start: 2024-09-15 | End: 2024-09-10 | Stop reason: ENTERED-IN-ERROR

## 2024-09-10 RX ORDER — SEMAGLUTIDE 1 MG/.5ML
1 INJECTION, SOLUTION SUBCUTANEOUS WEEKLY
Qty: 6 ML | Refills: 0 | Status: SHIPPED | OUTPATIENT
Start: 2024-09-10 | End: 2024-11-27

## 2024-09-16 ENCOUNTER — APPOINTMENT (OUTPATIENT)
Dept: PRIMARY CARE | Facility: CLINIC | Age: 64
End: 2024-09-16
Payer: COMMERCIAL

## 2024-09-16 VITALS — WEIGHT: 168 LBS | DIASTOLIC BLOOD PRESSURE: 70 MMHG | BODY MASS INDEX: 33.93 KG/M2 | SYSTOLIC BLOOD PRESSURE: 108 MMHG

## 2024-09-16 DIAGNOSIS — E66.09 OBESITY DUE TO EXCESS CALORIES WITHOUT SERIOUS COMORBIDITY, UNSPECIFIED CLASSIFICATION: ICD-10-CM

## 2024-09-24 ENCOUNTER — LAB REQUISITION (OUTPATIENT)
Dept: LAB | Facility: HOSPITAL | Age: 64
End: 2024-09-24
Payer: COMMERCIAL

## 2024-09-24 DIAGNOSIS — R22.31 LOCALIZED SWELLING, MASS AND LUMP, RIGHT UPPER LIMB: ICD-10-CM

## 2024-09-24 PROCEDURE — 88305 TISSUE EXAM BY PATHOLOGIST: CPT

## 2024-09-26 ENCOUNTER — TELEPHONE (OUTPATIENT)
Dept: PRIMARY CARE | Facility: CLINIC | Age: 64
End: 2024-09-26
Payer: COMMERCIAL

## 2024-09-26 ENCOUNTER — PATIENT MESSAGE (OUTPATIENT)
Dept: PRIMARY CARE | Facility: CLINIC | Age: 64
End: 2024-09-26
Payer: COMMERCIAL

## 2024-09-26 DIAGNOSIS — R73.01 IMPAIRED FASTING GLUCOSE: ICD-10-CM

## 2024-09-26 DIAGNOSIS — E66.09 OBESITY DUE TO EXCESS CALORIES WITHOUT SERIOUS COMORBIDITY, UNSPECIFIED CLASSIFICATION: ICD-10-CM

## 2024-09-26 DIAGNOSIS — F51.04 CHRONIC INSOMNIA: ICD-10-CM

## 2024-09-26 LAB
LABORATORY COMMENT REPORT: NORMAL
PATH REPORT.FINAL DX SPEC: NORMAL
PATH REPORT.GROSS SPEC: NORMAL
PATH REPORT.RELEVANT HX SPEC: NORMAL
PATH REPORT.TOTAL CANCER: NORMAL

## 2024-09-26 RX ORDER — ZOLPIDEM TARTRATE 5 MG/1
5 TABLET ORAL NIGHTLY PRN
Qty: 30 TABLET | Refills: 0 | Status: SHIPPED | OUTPATIENT
Start: 2024-09-26 | End: 2024-10-26

## 2024-09-26 RX ORDER — SEMAGLUTIDE 1 MG/.5ML
1 INJECTION, SOLUTION SUBCUTANEOUS WEEKLY
Qty: 6 ML | Refills: 0 | Status: SHIPPED | OUTPATIENT
Start: 2024-09-26 | End: 2024-12-13

## 2024-09-26 NOTE — TELEPHONE ENCOUNTER
Patient wants a refill of Zolpidem 5 mg.  Pharmacy is [facility] on [address] in [place].      Patient's number:  [phone]

## 2024-10-24 ENCOUNTER — TELEPHONE (OUTPATIENT)
Dept: PRIMARY CARE | Facility: CLINIC | Age: 64
End: 2024-10-24
Payer: COMMERCIAL

## 2024-10-24 DIAGNOSIS — F51.04 CHRONIC INSOMNIA: ICD-10-CM

## 2024-10-24 NOTE — TELEPHONE ENCOUNTER
Patient wants a refill on Zolpidem 5 mg.  Pharmacy is [facility] on [address] in [place].    Patient's number:  [phone]

## 2024-10-25 DIAGNOSIS — F51.04 CHRONIC INSOMNIA: ICD-10-CM

## 2024-10-25 RX ORDER — ZOLPIDEM TARTRATE 5 MG/1
5 TABLET ORAL NIGHTLY PRN
Qty: 30 TABLET | Refills: 0 | OUTPATIENT
Start: 2024-10-25 | End: 2024-11-24

## 2024-10-25 RX ORDER — ZOLPIDEM TARTRATE 5 MG/1
5 TABLET ORAL NIGHTLY PRN
Qty: 30 TABLET | Refills: 0 | Status: SHIPPED | OUTPATIENT
Start: 2024-10-25 | End: 2024-11-24

## 2024-11-21 ENCOUNTER — TELEPHONE (OUTPATIENT)
Dept: PRIMARY CARE | Facility: CLINIC | Age: 64
End: 2024-11-21
Payer: COMMERCIAL

## 2024-11-21 DIAGNOSIS — F51.04 CHRONIC INSOMNIA: ICD-10-CM

## 2024-11-21 RX ORDER — ZOLPIDEM TARTRATE 5 MG/1
5 TABLET ORAL NIGHTLY PRN
Qty: 30 TABLET | Refills: 0 | Status: SHIPPED | OUTPATIENT
Start: 2024-11-21 | End: 2024-12-21

## 2024-11-21 NOTE — TELEPHONE ENCOUNTER
Patient wants a refill on Zolpidem 5 mg.  Pharmacy is [facility] on [address] in [place].    Patient's number:  [phone]

## 2024-12-23 DIAGNOSIS — F51.04 CHRONIC INSOMNIA: ICD-10-CM

## 2024-12-23 RX ORDER — ZOLPIDEM TARTRATE 5 MG/1
5 TABLET ORAL NIGHTLY PRN
Qty: 30 TABLET | Refills: 0 | Status: SHIPPED | OUTPATIENT
Start: 2024-12-23 | End: 2025-01-22

## 2024-12-27 ENCOUNTER — TELEPHONE (OUTPATIENT)
Dept: PRIMARY CARE | Facility: CLINIC | Age: 64
End: 2024-12-27
Payer: COMMERCIAL

## 2024-12-27 DIAGNOSIS — E66.09 OBESITY DUE TO EXCESS CALORIES WITHOUT SERIOUS COMORBIDITY, UNSPECIFIED CLASS: ICD-10-CM

## 2024-12-27 DIAGNOSIS — E03.9 ACQUIRED HYPOTHYROIDISM: ICD-10-CM

## 2024-12-27 DIAGNOSIS — R73.01 IMPAIRED FASTING GLUCOSE: ICD-10-CM

## 2025-01-13 ENCOUNTER — LAB (OUTPATIENT)
Dept: LAB | Facility: LAB | Age: 65
End: 2025-01-13
Payer: COMMERCIAL

## 2025-01-13 ENCOUNTER — TELEPHONE (OUTPATIENT)
Dept: PRIMARY CARE | Facility: CLINIC | Age: 65
End: 2025-01-13

## 2025-01-13 DIAGNOSIS — J30.2 SEASONAL ALLERGIES: ICD-10-CM

## 2025-01-13 DIAGNOSIS — E03.9 ACQUIRED HYPOTHYROIDISM: ICD-10-CM

## 2025-01-13 DIAGNOSIS — E66.09 OBESITY DUE TO EXCESS CALORIES WITHOUT SERIOUS COMORBIDITY, UNSPECIFIED CLASS: ICD-10-CM

## 2025-01-13 DIAGNOSIS — R73.01 IMPAIRED FASTING GLUCOSE: ICD-10-CM

## 2025-01-13 LAB
ALBUMIN SERPL BCP-MCNC: 4.4 G/DL (ref 3.4–5)
ALP SERPL-CCNC: 88 U/L (ref 33–136)
ALT SERPL W P-5'-P-CCNC: 16 U/L (ref 7–45)
ANION GAP SERPL CALCULATED.3IONS-SCNC: 12 MMOL/L (ref 10–20)
APPEARANCE UR: CLEAR
AST SERPL W P-5'-P-CCNC: 16 U/L (ref 9–39)
BASOPHILS # BLD AUTO: 0.07 X10*3/UL (ref 0–0.1)
BASOPHILS NFR BLD AUTO: 0.9 %
BILIRUB SERPL-MCNC: 0.5 MG/DL (ref 0–1.2)
BILIRUB UR STRIP.AUTO-MCNC: NEGATIVE MG/DL
BUN SERPL-MCNC: 18 MG/DL (ref 6–23)
CALCIUM SERPL-MCNC: 9.3 MG/DL (ref 8.6–10.3)
CHLORIDE SERPL-SCNC: 104 MMOL/L (ref 98–107)
CHOLEST SERPL-MCNC: 219 MG/DL (ref 0–199)
CHOLEST/HDLC SERPL: 5.4 {RATIO}
CO2 SERPL-SCNC: 27 MMOL/L (ref 21–32)
COLOR UR: NORMAL
CREAT SERPL-MCNC: 0.75 MG/DL (ref 0.5–1.05)
EGFRCR SERPLBLD CKD-EPI 2021: 88 ML/MIN/1.73M*2
EOSINOPHIL # BLD AUTO: 0.25 X10*3/UL (ref 0–0.7)
EOSINOPHIL NFR BLD AUTO: 3.2 %
ERYTHROCYTE [DISTWIDTH] IN BLOOD BY AUTOMATED COUNT: 13.7 % (ref 11.5–14.5)
EST. AVERAGE GLUCOSE BLD GHB EST-MCNC: 103 MG/DL
GLUCOSE SERPL-MCNC: 80 MG/DL (ref 74–99)
GLUCOSE UR STRIP.AUTO-MCNC: NORMAL MG/DL
HBA1C MFR BLD: 5.2 %
HCT VFR BLD AUTO: 42.7 % (ref 36–46)
HDLC SERPL-MCNC: 40.8 MG/DL
HGB BLD-MCNC: 14.1 G/DL (ref 12–16)
IMM GRANULOCYTES # BLD AUTO: 0.14 X10*3/UL (ref 0–0.7)
IMM GRANULOCYTES NFR BLD AUTO: 1.8 % (ref 0–0.9)
KETONES UR STRIP.AUTO-MCNC: NEGATIVE MG/DL
LDLC SERPL CALC-MCNC: 130 MG/DL
LEUKOCYTE ESTERASE UR QL STRIP.AUTO: NEGATIVE
LYMPHOCYTES # BLD AUTO: 1.94 X10*3/UL (ref 1.2–4.8)
LYMPHOCYTES NFR BLD AUTO: 25.1 %
MCH RBC QN AUTO: 29.9 PG (ref 26–34)
MCHC RBC AUTO-ENTMCNC: 33 G/DL (ref 32–36)
MCV RBC AUTO: 91 FL (ref 80–100)
MONOCYTES # BLD AUTO: 0.61 X10*3/UL (ref 0.1–1)
MONOCYTES NFR BLD AUTO: 7.9 %
NEUTROPHILS # BLD AUTO: 4.72 X10*3/UL (ref 1.2–7.7)
NEUTROPHILS NFR BLD AUTO: 61.1 %
NITRITE UR QL STRIP.AUTO: NEGATIVE
NON HDL CHOLESTEROL: 178 MG/DL (ref 0–149)
NRBC BLD-RTO: 0 /100 WBCS (ref 0–0)
PH UR STRIP.AUTO: 5.5 [PH]
PLATELET # BLD AUTO: 321 X10*3/UL (ref 150–450)
POTASSIUM SERPL-SCNC: 4.4 MMOL/L (ref 3.5–5.3)
PROT SERPL-MCNC: 6.9 G/DL (ref 6.4–8.2)
PROT UR STRIP.AUTO-MCNC: NEGATIVE MG/DL
RBC # BLD AUTO: 4.71 X10*6/UL (ref 4–5.2)
RBC # UR STRIP.AUTO: NEGATIVE /UL
SODIUM SERPL-SCNC: 139 MMOL/L (ref 136–145)
SP GR UR STRIP.AUTO: 1.01
TRIGL SERPL-MCNC: 241 MG/DL (ref 0–149)
TSH SERPL-ACNC: 1.07 MIU/L (ref 0.44–3.98)
UROBILINOGEN UR STRIP.AUTO-MCNC: NORMAL MG/DL
VLDL: 48 MG/DL (ref 0–40)
WBC # BLD AUTO: 7.7 X10*3/UL (ref 4.4–11.3)

## 2025-01-13 PROCEDURE — 80061 LIPID PANEL: CPT

## 2025-01-13 PROCEDURE — 81003 URINALYSIS AUTO W/O SCOPE: CPT

## 2025-01-13 PROCEDURE — 80053 COMPREHEN METABOLIC PANEL: CPT

## 2025-01-13 PROCEDURE — 84443 ASSAY THYROID STIM HORMONE: CPT

## 2025-01-13 PROCEDURE — 85025 COMPLETE CBC W/AUTO DIFF WBC: CPT

## 2025-01-13 PROCEDURE — 83036 HEMOGLOBIN GLYCOSYLATED A1C: CPT

## 2025-01-13 RX ORDER — MONTELUKAST SODIUM 10 MG/1
10 TABLET ORAL DAILY
Qty: 90 TABLET | Refills: 1 | OUTPATIENT
Start: 2025-01-13

## 2025-01-13 RX ORDER — MONTELUKAST SODIUM 10 MG/1
10 TABLET ORAL DAILY
Qty: 90 TABLET | Refills: 1 | Status: SHIPPED | OUTPATIENT
Start: 2025-01-13 | End: 2025-07-12

## 2025-01-13 NOTE — TELEPHONE ENCOUNTER
Patient called RX line for refill of montelukast 10 mg to go to [facility] on [address] in [place].  Last seen 8-8-24.  CPE scheduled for 1-31-25

## 2025-01-20 ENCOUNTER — TELEPHONE (OUTPATIENT)
Dept: PRIMARY CARE | Facility: CLINIC | Age: 65
End: 2025-01-20
Payer: COMMERCIAL

## 2025-01-20 DIAGNOSIS — F51.04 CHRONIC INSOMNIA: ICD-10-CM

## 2025-01-20 RX ORDER — ZOLPIDEM TARTRATE 5 MG/1
5 TABLET ORAL NIGHTLY PRN
Qty: 30 TABLET | Refills: 0 | Status: SHIPPED | OUTPATIENT
Start: 2025-01-20 | End: 2025-02-19

## 2025-01-20 NOTE — TELEPHONE ENCOUNTER
Patient called on RX line to request a refill of Zolpidem 5 mg be forwarded to [facility] on [address] in [facility] pharmacy).  Patient's last visit was 8/8/2024 and she is scheduled for a physical on 1/31/2025.  Please advise.     Patient ph# [phone]

## 2025-01-21 NOTE — TELEPHONE ENCOUNTER
I have personally reviewed the OARRS report.  I have considered the risks of abuse, dependence, addiction and diversion.  I believe that it is clinically appropriate for patient to be prescribed this medication.      no

## 2025-01-23 DIAGNOSIS — E03.9 ACQUIRED HYPOTHYROIDISM: ICD-10-CM

## 2025-01-23 DIAGNOSIS — R52 PAIN: ICD-10-CM

## 2025-01-23 RX ORDER — MELOXICAM 15 MG/1
15 TABLET ORAL DAILY
Qty: 90 TABLET | Refills: 1 | Status: SHIPPED | OUTPATIENT
Start: 2025-01-23

## 2025-01-23 RX ORDER — LEVOTHYROXINE SODIUM 50 UG/1
50 TABLET ORAL DAILY
Qty: 90 TABLET | Refills: 3 | Status: SHIPPED | OUTPATIENT
Start: 2025-01-23

## 2025-01-26 ENCOUNTER — PATIENT MESSAGE (OUTPATIENT)
Dept: PRIMARY CARE | Facility: CLINIC | Age: 65
End: 2025-01-26
Payer: COMMERCIAL

## 2025-01-26 DIAGNOSIS — E66.09 OBESITY DUE TO EXCESS CALORIES WITHOUT SERIOUS COMORBIDITY, UNSPECIFIED CLASS: ICD-10-CM

## 2025-01-26 DIAGNOSIS — R73.01 IMPAIRED FASTING GLUCOSE: ICD-10-CM

## 2025-01-27 RX ORDER — SEMAGLUTIDE 2.4 MG/.75ML
2.4 INJECTION, SOLUTION SUBCUTANEOUS WEEKLY
Qty: 9 ML | Refills: 0 | Status: SHIPPED | OUTPATIENT
Start: 2025-01-27 | End: 2025-04-15

## 2025-01-31 ENCOUNTER — APPOINTMENT (OUTPATIENT)
Dept: PRIMARY CARE | Facility: CLINIC | Age: 65
End: 2025-01-31
Payer: COMMERCIAL

## 2025-01-31 VITALS
OXYGEN SATURATION: 97 % | DIASTOLIC BLOOD PRESSURE: 80 MMHG | BODY MASS INDEX: 32.66 KG/M2 | WEIGHT: 162 LBS | HEIGHT: 59 IN | HEART RATE: 88 BPM | SYSTOLIC BLOOD PRESSURE: 120 MMHG

## 2025-01-31 DIAGNOSIS — Z00.00 WELL ADULT EXAM: Primary | ICD-10-CM

## 2025-01-31 DIAGNOSIS — E03.9 ACQUIRED HYPOTHYROIDISM: ICD-10-CM

## 2025-01-31 DIAGNOSIS — Z12.31 ENCOUNTER FOR SCREENING MAMMOGRAM FOR BREAST CANCER: ICD-10-CM

## 2025-01-31 DIAGNOSIS — F51.04 CHRONIC INSOMNIA: ICD-10-CM

## 2025-01-31 DIAGNOSIS — L70.9 ACNE, UNSPECIFIED ACNE TYPE: ICD-10-CM

## 2025-01-31 PROBLEM — R74.02 ELEVATED LEVELS OF TRANSAMINASE & LACTIC ACID DEHYDROGENASE: Status: RESOLVED | Noted: 2025-01-31 | Resolved: 2025-01-31

## 2025-01-31 PROBLEM — J06.9 ACUTE UPPER RESPIRATORY INFECTION: Status: RESOLVED | Noted: 2023-01-25 | Resolved: 2025-01-31

## 2025-01-31 PROBLEM — R74.01 ELEVATED LEVELS OF TRANSAMINASE & LACTIC ACID DEHYDROGENASE: Status: RESOLVED | Noted: 2025-01-31 | Resolved: 2025-01-31

## 2025-01-31 RX ORDER — TRETINOIN 0.25 MG/G
GEL TOPICAL NIGHTLY
COMMUNITY
End: 2025-01-31 | Stop reason: SDUPTHER

## 2025-01-31 RX ORDER — TRAZODONE HYDROCHLORIDE 100 MG/1
100 TABLET ORAL NIGHTLY PRN
Qty: 90 TABLET | Refills: 0 | Status: SHIPPED | OUTPATIENT
Start: 2025-01-31 | End: 2025-05-01

## 2025-01-31 RX ORDER — TRETINOIN 0.25 MG/G
GEL TOPICAL NIGHTLY
Qty: 45 G | Refills: 0 | Status: SHIPPED | OUTPATIENT
Start: 2025-01-31

## 2025-01-31 ASSESSMENT — PAIN SCALES - GENERAL: PAINLEVEL_OUTOF10: 0-NO PAIN

## 2025-01-31 NOTE — PROGRESS NOTES
Subjective   Patient ID: Cheryl Richter is a 64 y.o. female who presents for Annual Exam.    HPI  Patient Care Team:  Jory Lorenzo MD as PCP - General (Family Medicine)  Jory Lorenzo MD as PCP - [facility] PCP    Cheryl Richter is seen for comprehensive physical exam.  PMH, PSH, family history and social history were reviewed and updated.  GYN - sees specialist  SHONDA/Depression - controlled on citalopram 40mg daily  Chronic Insomnia - has used ambien 5mg as needed, since ti was reduced from 10mg and no further ativan, she doesn't feel she has been sleeping and thus she has been taking multiple over the counter sleep aides also with the ambien   Hypothyroidism - taking on empty stomach,    OARRS:  Jory Lorenzo MD on 1/31/2025 11:23 AM  I have personally reviewed the OARRS report for Cheryl Richter. I have considered the risks of abuse, dependence, addiction and diversion    Controlled Substance Agreement:  Date of the Last Agreement: 8/2024  Reviewed Controlled Substance Agreement including but not limited to the benefits, risks, and alternatives to treatment with a Controlled Substance medication(s).    Sleep Aids:   What is the patient's goal of therapy? Improved sleep   Is this being achieved with current treatment? yes    Activities of Daily Living:   Is your overall impression that this patient is benefiting (symptom reduction outweighs side effects) from sleep aid therapy? Yes     Review of Systems   Constitutional:  Negative for chills, fever and unexpected weight change.   HENT:  Negative for congestion, ear pain, hearing loss, rhinorrhea, sore throat and trouble swallowing.    Eyes:  Negative for visual disturbance.   Respiratory:  Negative for cough and shortness of breath.    Cardiovascular:  Negative for chest pain and leg swelling.   Gastrointestinal:  Negative for abdominal pain, blood in stool, nausea and vomiting.   Genitourinary:  Negative for dysuria, hematuria, vaginal bleeding and  "vaginal discharge.   Musculoskeletal:  Negative for arthralgias and myalgias.   Skin:  Negative for rash.   Neurological:  Negative for dizziness, weakness and numbness.   Psychiatric/Behavioral:  Negative for dysphoric mood. The patient is not nervous/anxious.        Objective   /80   Pulse 88   Ht 1.499 m (4' 11\")   Wt 73.5 kg (162 lb)   LMP  (LMP Unknown)   SpO2 97%   BMI 32.72 kg/m²     Physical Exam  Vitals and nursing note reviewed.   Constitutional:       General: She is not in acute distress.  HENT:      Right Ear: Tympanic membrane and ear canal normal.      Left Ear: Tympanic membrane and ear canal normal.      Nose: Nose normal.      Mouth/Throat:      Mouth: Mucous membranes are moist.   Eyes:      Extraocular Movements: Extraocular movements intact.      Pupils: Pupils are equal, round, and reactive to light.   Neck:      Vascular: No carotid bruit.   Cardiovascular:      Rate and Rhythm: Normal rate and regular rhythm.      Pulses:           Dorsalis pedis pulses are 2+ on the right side and 2+ on the left side.        Posterior tibial pulses are 2+ on the right side and 2+ on the left side.   Pulmonary:      Effort: Pulmonary effort is normal.      Breath sounds: Normal breath sounds.   Abdominal:      General: Abdomen is flat.      Palpations: Abdomen is soft.      Tenderness: There is no abdominal tenderness.   Musculoskeletal:         General: Normal range of motion.      Right lower leg: No edema.      Left lower leg: No edema.   Lymphadenopathy:      Cervical: No cervical adenopathy.   Skin:     General: Skin is warm.      Findings: No rash.   Neurological:      General: No focal deficit present.      Mental Status: She is alert.   Psychiatric:         Mood and Affect: Mood normal.         Assessment/Plan   Assessment & Plan  Well adult exam  Preventative measures discussed in detail.  Immunizations reviewed and discussed.  Reviewed labs with patient.         Acne, unspecified acne " type  Stable. Continue with cream  Orders:    tretinoin (Retin-A) 0.025 % gel; Apply topically once daily at bedtime.    Encounter for screening mammogram for breast cancer    Orders:    BI mammo bilateral screening tomosynthesis; Future    Acquired hypothyroidism  Euthyroid on current dose of levothyroxine. Continue to take on empty stomach.   Lab Results   Component Value Date    TSH 1.07 01/13/2025    TSH 2.38 12/08/2023          Chronic insomnia  Good sleep hygiene discussed.  Begin trazodone as prescribed.  Indications, benefits and potential side effects discussed. Will try to reduce use of OTC medications as we discussed in detail the risk of combining those medications with her zolpidem.    Orders:    traZODone (Desyrel) 100 mg tablet; Take 1 tablet (100 mg) by mouth as needed at bedtime for sleep.        Follow up 6 Months, sooner with any problems or concerns.

## 2025-02-08 ASSESSMENT — ENCOUNTER SYMPTOMS
DYSURIA: 0
FEVER: 0
COUGH: 0
CHILLS: 0
DIZZINESS: 0
NERVOUS/ANXIOUS: 0
WEAKNESS: 0
BLOOD IN STOOL: 0
MYALGIAS: 0
VOMITING: 0
TROUBLE SWALLOWING: 0
ABDOMINAL PAIN: 0
SHORTNESS OF BREATH: 0
DYSPHORIC MOOD: 0
NUMBNESS: 0
UNEXPECTED WEIGHT CHANGE: 0
ARTHRALGIAS: 0
SORE THROAT: 0
RHINORRHEA: 0
NAUSEA: 0
HEMATURIA: 0

## 2025-02-09 NOTE — ASSESSMENT & PLAN NOTE
Euthyroid on current dose of levothyroxine. Continue to take on empty stomach.   Lab Results   Component Value Date    TSH 1.07 01/13/2025    TSH 2.38 12/08/2023

## 2025-02-09 NOTE — ASSESSMENT & PLAN NOTE
Good sleep hygiene discussed.  Begin trazodone as prescribed.  Indications, benefits and potential side effects discussed. Will try to reduce use of OTC medications as we discussed in detail the risk of combining those medications with her zolpidem.    Orders:    traZODone (Desyrel) 100 mg tablet; Take 1 tablet (100 mg) by mouth as needed at bedtime for sleep.

## 2025-02-25 DIAGNOSIS — E66.09 OBESITY DUE TO EXCESS CALORIES WITHOUT SERIOUS COMORBIDITY, UNSPECIFIED CLASS: ICD-10-CM

## 2025-02-26 ENCOUNTER — TELEPHONE (OUTPATIENT)
Dept: PRIMARY CARE | Facility: CLINIC | Age: 65
End: 2025-02-26
Payer: COMMERCIAL

## 2025-02-26 DIAGNOSIS — F51.04 CHRONIC INSOMNIA: ICD-10-CM

## 2025-02-26 RX ORDER — ZOLPIDEM TARTRATE 5 MG/1
5 TABLET ORAL NIGHTLY PRN
Qty: 30 TABLET | Refills: 0 | Status: SHIPPED | OUTPATIENT
Start: 2025-02-26 | End: 2025-03-28

## 2025-02-26 NOTE — TELEPHONE ENCOUNTER
Patient wants a refill of Zolpidem 5 mg.  Pharmacy is [facility] on [address] in [place].

## 2025-02-28 ENCOUNTER — OFFICE VISIT (OUTPATIENT)
Dept: URGENT CARE | Age: 65
End: 2025-02-28
Payer: COMMERCIAL

## 2025-02-28 ENCOUNTER — APPOINTMENT (OUTPATIENT)
Dept: PRIMARY CARE | Facility: CLINIC | Age: 65
End: 2025-02-28
Payer: COMMERCIAL

## 2025-02-28 VITALS
OXYGEN SATURATION: 97 % | SYSTOLIC BLOOD PRESSURE: 146 MMHG | TEMPERATURE: 97.2 F | RESPIRATION RATE: 18 BRPM | DIASTOLIC BLOOD PRESSURE: 77 MMHG | HEART RATE: 91 BPM

## 2025-02-28 DIAGNOSIS — H61.23 BILATERAL IMPACTED CERUMEN: Primary | ICD-10-CM

## 2025-02-28 ASSESSMENT — ENCOUNTER SYMPTOMS: DIZZINESS: 1

## 2025-02-28 NOTE — PROGRESS NOTES
Subjective   Patient ID: Cheryl Richter is a 64 y.o. female. They present today with a chief complaint of Earache (Right ear x 1 day ).    History of Present Illness  Cheryl Richter is a 64 y.o. female who presents to the clinic for right ear pain/fullness.  Patient states she tried Debrox to the right ear with no relief.  Patient she has had intermittent dizziness that started last night.  Patient states in clinic the dizziness has improved since yesterday. Pt denies any chest pain, sob, N/V at this time in clinic.             Past Medical History  Allergies as of 02/28/2025 - Reviewed 02/28/2025   Allergen Reaction Noted    Bumetanide Other 01/31/2025    Bupropion Other 01/31/2025    Bupropion hcl Unknown 11/01/2023    Sulfa (sulfonamide antibiotics) Unknown 11/01/2023    Venlafaxine Unknown 12/18/2023       (Not in a hospital admission)       Past Medical History:   Diagnosis Date    Acute upper respiratory infection 01/25/2023       Past Surgical History:   Procedure Laterality Date    APPENDECTOMY          reports that she has quit smoking. Her smoking use included cigarettes. She has never used smokeless tobacco. She reports current alcohol use of about 1.0 - 2.0 standard drink of alcohol per week. She reports that she does not use drugs.    Review of Systems  Review of Systems   HENT:          Bilateral ear fullness   Neurological:  Positive for dizziness.   All other systems reviewed and are negative.                                 Objective    Vitals:    02/28/25 0949   BP: 146/77   Pulse: 91   Resp: 18   Temp: 36.2 °C (97.2 °F)   SpO2: 97%     No LMP recorded (lmp unknown). Patient is postmenopausal.    Physical Exam  Constitutional:       Appearance: Normal appearance.   HENT:      Right Ear: There is impacted cerumen.      Left Ear: There is impacted cerumen.   Neurological:      General: No focal deficit present.      Mental Status: She is alert and oriented to person, place, and time. Mental status is  at baseline.   Psychiatric:         Mood and Affect: Mood normal.         Behavior: Behavior normal.         Procedures  Meron Caba MA water lavage to bilateral ears  Post cerumen removal  Right TM visualized, no erythema noted  Left TM visualized, no erythema noted    Point of Care Test & Imaging Results from this visit  No results found for this visit on 02/28/25.   No results found.    Diagnostic study results (if any) were reviewed by HAIR Smith.    Assessment/Plan   Allergies, medications, history, and pertinent labs/EKGs/Imaging reviewed by HAIR Smith.     Medical Decision Making  Cerumen removed in office via irrigation with complete resolution of symptoms. TM is intact with no signs of perforation. No sign of secondary infection, otits media or otitis externa.  Follow up as needed. Return if any new symptoms occur, ER if worsens. follow up with pcp. patient verbalized understanding and agrees with plan    Pt plan:  Thank you for visiting [facility] urgent care.  Today you had impacted cerumen removed from bilateral ears.  Please monitor dizziness.  If dizziness is still presents by tomorrow morning please go to local emergency department for further evaluation.    Please follow-up with your primary care doctor next 5 to 7 days.    Orders and Diagnoses  There are no diagnoses linked to this encounter.    Medical Admin Record      Patient disposition: Home    Electronically signed by HAIR Smith  10:36 AM

## 2025-02-28 NOTE — PATIENT INSTRUCTIONS
Thank you for visiting [facility] urgent care.  Today you had impacted cerumen removed from bilateral ears.  Please monitor dizziness.  If dizziness is still presents by tomorrow morning please go to local emergency department for further evaluation.    Please follow-up with your primary care doctor next 5 to 7 days.    Please follow up with your primary provider within one week if symptoms do not improve.  You may schedule an appointment online at [email or web address] or call [phone]. Go to the Emergency Department if symptoms significantly worsen or if you develop chest pain or shortness of breath.

## 2025-03-03 ENCOUNTER — APPOINTMENT (OUTPATIENT)
Dept: PRIMARY CARE | Facility: CLINIC | Age: 65
End: 2025-03-03
Payer: COMMERCIAL

## 2025-03-03 DIAGNOSIS — E66.09 CLASS 1 OBESITY DUE TO EXCESS CALORIES WITHOUT SERIOUS COMORBIDITY WITH BODY MASS INDEX (BMI) OF 32.0 TO 32.9 IN ADULT: Primary | ICD-10-CM

## 2025-03-03 DIAGNOSIS — E66.811 CLASS 1 OBESITY DUE TO EXCESS CALORIES WITHOUT SERIOUS COMORBIDITY WITH BODY MASS INDEX (BMI) OF 32.0 TO 32.9 IN ADULT: Primary | ICD-10-CM

## 2025-03-03 PROCEDURE — 1123F ACP DISCUSS/DSCN MKR DOCD: CPT | Performed by: FAMILY MEDICINE

## 2025-03-03 PROCEDURE — 1036F TOBACCO NON-USER: CPT | Performed by: FAMILY MEDICINE

## 2025-03-03 PROCEDURE — 1159F MED LIST DOCD IN RCRD: CPT | Performed by: FAMILY MEDICINE

## 2025-03-03 PROCEDURE — 99213 OFFICE O/P EST LOW 20 MIN: CPT | Performed by: FAMILY MEDICINE

## 2025-03-03 ASSESSMENT — LIFESTYLE VARIABLES
HOW OFTEN DO YOU HAVE SIX OR MORE DRINKS ON ONE OCCASION: NEVER
HOW OFTEN DO YOU HAVE A DRINK CONTAINING ALCOHOL: NEVER
HOW MANY STANDARD DRINKS CONTAINING ALCOHOL DO YOU HAVE ON A TYPICAL DAY: PATIENT DOES NOT DRINK
SKIP TO QUESTIONS 9-10: 1
AUDIT-C TOTAL SCORE: 0

## 2025-03-03 ASSESSMENT — PATIENT HEALTH QUESTIONNAIRE - PHQ9
SUM OF ALL RESPONSES TO PHQ9 QUESTIONS 1 AND 2: 0
1. LITTLE INTEREST OR PLEASURE IN DOING THINGS: NOT AT ALL
2. FEELING DOWN, DEPRESSED OR HOPELESS: NOT AT ALL

## 2025-03-03 ASSESSMENT — COLUMBIA-SUICIDE SEVERITY RATING SCALE - C-SSRS: 1. IN THE PAST MONTH, HAVE YOU WISHED YOU WERE DEAD OR WISHED YOU COULD GO TO SLEEP AND NOT WAKE UP?: NO

## 2025-03-03 NOTE — PROGRESS NOTES
Subjective   Patient ID: Cheryl Richter is a 64 y.o. female who presents for Follow-up (Pt is in for medication review from wegovy to zepbound. No other concerns. Has question regarding PA, would like 90 day supply.).    HPI  Verbal consent was requested and obtained from patient on this date for a telehealth visit.  Both audio and visual were used.  Cheryl presents complaining of weight gain on the Wegovy. Up to 168lbs. Appetite is back up. Trying to limit sweets. Eating balanced meals. Hungry by the end of the day so eating more in evening.  Now it is getting nicer as she is being more active and trying to walk more in the evening    Review of Systems    Objective   Vital Signs: Weight 168  LMP  (LMP Unknown)     Physical Exam  Alert.  No acute distress.  Judgment and insight intact.  Assessment & Plan  Class 1 obesity due to excess calories without serious comorbidity with body mass index (BMI) of 32.0 to 32.9 in adult  Diet and exercise discussed at length.  Will change over from ice cream to high-protein low sugar frozen yogurt.  Begin Zepbound 5 mg weekly as prescribed.  Indications, benefits and potential side effects discussed in detail.  Orders:    tirzepatide, weight loss, (Zepbound) 5 mg/0.5 mL injection; Inject 5 mg under the skin every 7 days.        Follow up 3 Months, sooner with any problems or concerns.

## 2025-03-03 NOTE — ASSESSMENT & PLAN NOTE
Diet and exercise discussed at length.  Will change over from ice cream to high-protein low sugar frozen yogurt.  Begin Zepbound 5 mg weekly as prescribed.  Indications, benefits and potential side effects discussed in detail.  Orders:    tirzepatide, weight loss, (Zepbound) 5 mg/0.5 mL injection; Inject 5 mg under the skin every 7 days.

## 2025-03-28 ENCOUNTER — TELEPHONE (OUTPATIENT)
Dept: PRIMARY CARE | Facility: CLINIC | Age: 65
End: 2025-03-28
Payer: COMMERCIAL

## 2025-03-28 DIAGNOSIS — F51.04 CHRONIC INSOMNIA: ICD-10-CM

## 2025-03-28 RX ORDER — ZOLPIDEM TARTRATE 5 MG/1
5 TABLET ORAL NIGHTLY PRN
Qty: 30 TABLET | Refills: 0 | Status: SHIPPED | OUTPATIENT
Start: 2025-03-28 | End: 2025-04-27

## 2025-03-28 NOTE — TELEPHONE ENCOUNTER
Patient requesting a refill on zolpidem 5 mg going to [facility].    Last OV: 01/31/2025    Contact: [phone]

## 2025-04-01 ENCOUNTER — PATIENT MESSAGE (OUTPATIENT)
Dept: PRIMARY CARE | Facility: CLINIC | Age: 65
End: 2025-04-01
Payer: COMMERCIAL

## 2025-04-01 DIAGNOSIS — E66.09 OBESITY DUE TO EXCESS CALORIES WITHOUT SERIOUS COMORBIDITY, UNSPECIFIED CLASS: Primary | ICD-10-CM

## 2025-04-20 DIAGNOSIS — F43.21 ADJUSTMENT DISORDER WITH DEPRESSED MOOD: ICD-10-CM

## 2025-04-21 RX ORDER — CITALOPRAM 20 MG/1
40 TABLET, FILM COATED ORAL DAILY
Qty: 180 TABLET | Refills: 3 | Status: SHIPPED | OUTPATIENT
Start: 2025-04-21

## 2025-04-30 ENCOUNTER — TELEPHONE (OUTPATIENT)
Dept: PRIMARY CARE | Facility: CLINIC | Age: 65
End: 2025-04-30
Payer: COMMERCIAL

## 2025-04-30 ENCOUNTER — PATIENT MESSAGE (OUTPATIENT)
Dept: PRIMARY CARE | Facility: CLINIC | Age: 65
End: 2025-04-30
Payer: COMMERCIAL

## 2025-04-30 DIAGNOSIS — R73.01 IMPAIRED FASTING GLUCOSE: ICD-10-CM

## 2025-04-30 DIAGNOSIS — E66.09 OBESITY DUE TO EXCESS CALORIES WITHOUT SERIOUS COMORBIDITY, UNSPECIFIED CLASS: ICD-10-CM

## 2025-04-30 DIAGNOSIS — F51.04 CHRONIC INSOMNIA: ICD-10-CM

## 2025-04-30 RX ORDER — ZOLPIDEM TARTRATE 5 MG/1
5 TABLET ORAL NIGHTLY PRN
Qty: 30 TABLET | Refills: 0 | Status: SHIPPED | OUTPATIENT
Start: 2025-04-30 | End: 2025-05-30

## 2025-04-30 NOTE — TELEPHONE ENCOUNTER
Patient requesting a refill on Zolpidem 5 mg going to [facility] on [facility].    Last OV: 01/31/2025    Contact: [phone]

## 2025-05-28 ENCOUNTER — TELEPHONE (OUTPATIENT)
Dept: PRIMARY CARE | Facility: CLINIC | Age: 65
End: 2025-05-28
Payer: COMMERCIAL

## 2025-05-28 DIAGNOSIS — F51.04 CHRONIC INSOMNIA: ICD-10-CM

## 2025-05-28 RX ORDER — ZOLPIDEM TARTRATE 5 MG/1
5 TABLET ORAL NIGHTLY PRN
Qty: 30 TABLET | Refills: 0 | Status: SHIPPED | OUTPATIENT
Start: 2025-05-28 | End: 2025-06-27

## 2025-05-28 NOTE — TELEPHONE ENCOUNTER
Patient called on RX line to request a refill of Zolpidem 5 mg be forwarded to [facility] in [place].  Her last visit was a virtual for weight check and she had a physical on 1/31/2025.  No future appointments scheduled.  Please advise.     Patient ph# [phone]

## 2025-06-25 ENCOUNTER — TELEPHONE (OUTPATIENT)
Dept: PRIMARY CARE | Facility: CLINIC | Age: 65
End: 2025-06-25
Payer: COMMERCIAL

## 2025-06-25 DIAGNOSIS — F51.04 CHRONIC INSOMNIA: ICD-10-CM

## 2025-06-25 RX ORDER — ZOLPIDEM TARTRATE 5 MG/1
5 TABLET ORAL NIGHTLY PRN
Qty: 30 TABLET | Refills: 0 | Status: SHIPPED | OUTPATIENT
Start: 2025-06-25 | End: 2025-07-25

## 2025-06-25 NOTE — TELEPHONE ENCOUNTER
Patient called RX line for refill on Zolpidem 5 mg.  Last OV 1/30/25, next OV 7/1/25 [facility] on [facility] in [place]

## 2025-06-30 DIAGNOSIS — J30.2 SEASONAL ALLERGIES: ICD-10-CM

## 2025-06-30 RX ORDER — MONTELUKAST SODIUM 10 MG/1
10 TABLET ORAL DAILY
Qty: 90 TABLET | Refills: 1 | Status: SHIPPED | OUTPATIENT
Start: 2025-06-30

## 2025-07-01 ENCOUNTER — APPOINTMENT (OUTPATIENT)
Dept: PRIMARY CARE | Facility: CLINIC | Age: 65
End: 2025-07-01
Payer: COMMERCIAL

## 2025-07-01 VITALS — WEIGHT: 175 LBS | BODY MASS INDEX: 35.35 KG/M2

## 2025-07-01 DIAGNOSIS — F51.04 CHRONIC INSOMNIA: Primary | ICD-10-CM

## 2025-07-01 DIAGNOSIS — E66.09 OBESITY DUE TO EXCESS CALORIES WITHOUT SERIOUS COMORBIDITY, UNSPECIFIED CLASS: ICD-10-CM

## 2025-07-01 PROCEDURE — 99213 OFFICE O/P EST LOW 20 MIN: CPT | Performed by: FAMILY MEDICINE

## 2025-07-01 PROCEDURE — 1160F RVW MEDS BY RX/DR IN RCRD: CPT | Performed by: FAMILY MEDICINE

## 2025-07-01 PROCEDURE — 1159F MED LIST DOCD IN RCRD: CPT | Performed by: FAMILY MEDICINE

## 2025-07-01 ASSESSMENT — COLUMBIA-SUICIDE SEVERITY RATING SCALE - C-SSRS: 1. IN THE PAST MONTH, HAVE YOU WISHED YOU WERE DEAD OR WISHED YOU COULD GO TO SLEEP AND NOT WAKE UP?: NO

## 2025-07-01 NOTE — PROGRESS NOTES
Subjective   Patient ID: Cheryl Richter is a 65 y.o. female who presents for Follow-up (Pt is in to review medication).    HPI  Verbal consent was requested and obtained from patient on this date for a telehealth visit. Both participants were in the state [facility] and both audio and visual connection were used for visit.    Cheryl presents for follow-up on her Zepbound.  Unfortunately her insurance stopped covering it and therefore she has not been on it and has been regaining her weight.  Her appetite is an issue.  She has not been able to maintain healthy habits since being off the medication.  In addition she presents for follow-up on her Ambien.  She takes 5 mg only as needed for difficulty sleeping.  She goes to bed roughly at the same time nightly.  She is not exercising regularly.  She does not take medication more than she should. [place] reviewed and consistent with chart.  Controlled substance agreement signed August 8, 2024    Review of Systems  All other systems have been reviewed and are negative except as noted in the HPI.       Objective   Vital Signs:  Wt 79.4 kg (175 lb)   LMP  (LMP Unknown)   BMI 35.35 kg/m²   Wt Readings from Last 3 Encounters:   07/01/25 79.4 kg (175 lb)   01/31/25 73.5 kg (162 lb)   09/16/24 76.2 kg (168 lb)     Physical Exam  Alert.  No acute distress.  Judgment and insight intact.  Assessment & Plan  Chronic insomnia  Good sleep hygiene discussed.  Continue the Ambien only as needed.       Obesity due to excess calories without serious comorbidity, unspecified class  Diet and exercise discussed.

## 2025-07-14 ENCOUNTER — TELEPHONE (OUTPATIENT)
Dept: PRIMARY CARE | Facility: CLINIC | Age: 65
End: 2025-07-14
Payer: COMMERCIAL

## 2025-07-14 DIAGNOSIS — L70.9 ACNE, UNSPECIFIED ACNE TYPE: ICD-10-CM

## 2025-07-14 RX ORDER — TRETINOIN 0.25 MG/G
GEL TOPICAL NIGHTLY
Qty: 45 G | Refills: 0 | Status: SHIPPED | OUTPATIENT
Start: 2025-07-14

## 2025-07-14 NOTE — TELEPHONE ENCOUNTER
Patient called Rx line and requested a refill for Tretinoin 0.025% gel. Last ov 7/1/2025. No future visit scheduled.  Pharmacy: [facility] [id number] [facility] [address].

## 2025-07-20 RX ORDER — SEMAGLUTIDE 2.4 MG/.75ML
INJECTION, SOLUTION SUBCUTANEOUS
Refills: 2 | OUTPATIENT
Start: 2025-07-20

## 2025-07-25 ENCOUNTER — TELEPHONE (OUTPATIENT)
Dept: PRIMARY CARE | Facility: CLINIC | Age: 65
End: 2025-07-25
Payer: COMMERCIAL

## 2025-07-25 DIAGNOSIS — F51.04 CHRONIC INSOMNIA: ICD-10-CM

## 2025-07-25 RX ORDER — ZOLPIDEM TARTRATE 5 MG/1
5 TABLET ORAL NIGHTLY PRN
Qty: 30 TABLET | Refills: 0 | Status: SHIPPED | OUTPATIENT
Start: 2025-07-25 | End: 2025-08-24

## 2025-07-25 NOTE — TELEPHONE ENCOUNTER
Patient wants a refill of Zolpidem 5 mg    Pharmacy is [facility] on [address] in [place]    LOV:  01/31/25  No upcoming visit

## 2025-07-26 DIAGNOSIS — R52 PAIN: ICD-10-CM

## 2025-07-28 RX ORDER — MELOXICAM 15 MG/1
15 TABLET ORAL DAILY
Qty: 90 TABLET | Refills: 1 | Status: SHIPPED | OUTPATIENT
Start: 2025-07-28

## 2025-08-19 DIAGNOSIS — Z12.31 ENCOUNTER FOR SCREENING MAMMOGRAM FOR BREAST CANCER: ICD-10-CM

## 2025-08-22 ENCOUNTER — TELEPHONE (OUTPATIENT)
Dept: PRIMARY CARE | Facility: CLINIC | Age: 65
End: 2025-08-22
Payer: COMMERCIAL

## 2025-08-22 DIAGNOSIS — F51.04 CHRONIC INSOMNIA: ICD-10-CM

## 2025-08-22 RX ORDER — ZOLPIDEM TARTRATE 5 MG/1
5 TABLET ORAL NIGHTLY PRN
Qty: 30 TABLET | Refills: 0 | Status: SHIPPED | OUTPATIENT
Start: 2025-08-22 | End: 2025-09-21